# Patient Record
Sex: FEMALE | Race: WHITE | NOT HISPANIC OR LATINO | Employment: FULL TIME | URBAN - METROPOLITAN AREA
[De-identification: names, ages, dates, MRNs, and addresses within clinical notes are randomized per-mention and may not be internally consistent; named-entity substitution may affect disease eponyms.]

---

## 2017-01-03 ENCOUNTER — OFFICE VISIT (OUTPATIENT)
Dept: MEDICAL GROUP | Facility: MEDICAL CENTER | Age: 27
End: 2017-01-03
Payer: OTHER GOVERNMENT

## 2017-01-03 VITALS
SYSTOLIC BLOOD PRESSURE: 100 MMHG | HEART RATE: 77 BPM | WEIGHT: 170 LBS | DIASTOLIC BLOOD PRESSURE: 62 MMHG | TEMPERATURE: 98.4 F | OXYGEN SATURATION: 97 % | RESPIRATION RATE: 16 BRPM

## 2017-01-03 DIAGNOSIS — J30.2 SEASONAL ALLERGIC RHINITIS, UNSPECIFIED ALLERGIC RHINITIS TRIGGER: ICD-10-CM

## 2017-01-03 PROCEDURE — 99213 OFFICE O/P EST LOW 20 MIN: CPT | Performed by: NURSE PRACTITIONER

## 2017-01-03 RX ORDER — FEXOFENADINE HCL 60 MG/1
60 TABLET, FILM COATED ORAL DAILY
COMMUNITY

## 2017-01-03 NOTE — PROGRESS NOTES
Subjective:   Nasra Dhillon is a 26 y.o. female here today for allergies and referral to allergist.    Seasonal allergies  Patient presents today requesting referral to an allergist for seasonal and environmental allergies.  Has been using allegra and flonase daily with partial improvement.  C/o itchy eyes, rhinorrhea and PND t/o day and night, contact dermatitis from touching dogs.  Denies wheezing, sob, chest pain, difficulty breathing.  Reports allergy symptoms started in 2013 upon moving from [facility].           Current medicines (including changes today)  Current Outpatient Prescriptions   Medication Sig Dispense Refill   • fexofenadine (ALLEGRA) 60 MG Tab Take 60 mg by mouth every day.     • Fluticasone Propionate (FLONASE NA) Spray  in nose.       No current facility-administered medications for this visit.     She  has no past medical history of Cancer.    ROS   No chest pain, no shortness of breath, no abdominal pain       Objective:     Blood pressure 100/62, pulse 77, temperature 36.9 °C (98.4 °F), resp. rate 16, weight 77.111 kg (170 lb), last menstrual period 12/12/2016, SpO2 97 %, not currently breastfeeding. There is no height on file to calculate BMI.   Physical Exam:  Constitutional: Alert, no distress.  Skin: Warm, dry, good turgor, no rashes in visible areas.  Eye: Equal, round and reactive, conjunctiva clear, lids normal.  ENMT: Lips without lesions, good dentition, oropharynx clear. Nasal turbinates are swollen, pale and boggy.  Neck: Trachea midline, no masses, no thyromegaly. No cervical or supraclavicular lymphadenopathy  Respiratory: Unlabored respiratory effort, lungs clear to auscultation, no wheezes, no ronchi.  Cardiovascular: Normal S1, S2, no murmur, no edema.  Psych: Alert and oriented x3, normal affect and mood.        Assessment and Plan:   The following treatment plan was discussed    1. Seasonal allergic rhinitis, unspecified allergic rhinitis trigger  Continue  Allegra and Flonase.  Avoid allergens as much as possible.  Referred to allergist as requested.  Follow-up annually or sooner as needed.      Followup: Return in about 1 year (around 1/3/2018).

## 2017-01-03 NOTE — ASSESSMENT & PLAN NOTE
Patient presents today requesting referral to an allergist for seasonal and environmental allergies.  Has been using allegra and flonase daily with partial improvement.  C/o itchy eyes, rhinorrhea and PND t/o day and night, contact dermatitis from touching dogs.  Denies wheezing, sob, chest pain, difficulty breathing.  Reports allergy symptoms started in 2013 upon moving from [facility].

## 2017-01-03 NOTE — MR AVS SNAPSHOT
Nasra Cabralesn   1/3/2017 8:00 AM   Office Visit   MRN: [id number]    Department:  [facility]   Dept Phone:  [phone]    Description:  Female : 1990   Provider:  JORDYN Arellano           Reason for Visit     Referral Needed           Allergies as of 1/3/2017     No Known Allergies      You were diagnosed with     Seasonal allergic rhinitis, unspecified allergic rhinitis trigger   [[id number]]         Vital Signs     Blood Pressure Pulse Temperature Respirations Weight Oxygen Saturation    100/62 mmHg 77 36.9 °C (98.4 °F) 16 77.111 kg (170 lb) 97%    Last Menstrual Period Breastfeeding? Smoking Status             2016 No Never Smoker          Basic Information     Date Of Birth Sex Race Ethnicity Preferred Language    1990 Female White Non- English      Problem List              ICD-10-CM Priority Class Noted - Resolved    Seasonal allergies J30.2   2016 - Present    Preventative health care Z00.00   2016 - Present      Health Maintenance        Date Due Completion Dates    IMM HEP B VACCINE (1 of 3 - Primary Series) 1990 ---    IMM HEP A VACCINE (1 of 2 - Standard Series) 1991 ---    IMM HPV VACCINE (1 of 3 - Female 3 Dose Series) 2001 ---    IMM VARICELLA (CHICKENPOX) VACCINE (1 of 2 - 2 Dose Adolescent Series) 2003 ---    IMM DTaP/Tdap/Td Vaccine (1 - Tdap) 2009 ---    PAP SMEAR 2011 ---    IMM INFLUENZA (1) 2016 ---            Current Immunizations     No immunizations on file.      Below and/or attached are the medications your provider expects you to take. Review all of your home medications and newly ordered medications with your provider and/or pharmacist. Follow medication instructions as directed by your provider and/or pharmacist. Please keep your medication list with you and share with your provider. Update the information when medications are discontinued, doses are changed, or new medications (including  over-the-counter products) are added; and carry medication information at all times in the event of emergency situations     Allergies:  No Known Allergies          Medications  Valid as of: January 03, 2017 -  8:00 AM    Generic Name Brand Name Tablet Size Instructions for use    Fluticasone Propionate   Spray  in nose.        .                 Medicines prescribed today were sent to:     [facility] [id number]  [place], [place] - [address] AT [facility] [address] [facility]    [address] [place] [address]    Phone: [phone] Fax: [phone]    Open 24 Hours?: No      Medication refill instructions:       If your prescription bottle indicates you have medication refills left, it is not necessary to call your provider’s office. Please contact your pharmacy and they will refill your medication.    If your prescription bottle indicates you do not have any refills left, you may request refills at any time through one of the following ways: The online myMedScore system (except Urgent Care), by calling your provider’s office, or by asking your pharmacy to contact your provider’s office with a refill request. Medication refills are processed only during regular business hours and may not be available until the next business day. Your provider may request additional information or to have a follow-up visit with you prior to refilling your medication.   *Please Note: Medication refills are assigned a new Rx number when refilled electronically. Your pharmacy may indicate that no refills were authorized even though a new prescription for the same medication is available at the pharmacy. Please request the medicine by name with the pharmacy before contacting your provider for a refill.        Referral     A referral request has been sent to our patient care coordination department. Please allow 3-5 business days for us to process this request and contact you either by phone or mail. If you do not hear from us  by the 5th business day, please call us at [phone].           Cellfire Access Code: Activation code not generated  Current Cellfire Status: Active

## 2017-07-31 ENCOUNTER — OFFICE VISIT (OUTPATIENT)
Dept: MEDICAL GROUP | Facility: MEDICAL CENTER | Age: 27
End: 2017-07-31
Payer: OTHER GOVERNMENT

## 2017-07-31 VITALS
WEIGHT: 165.6 LBS | OXYGEN SATURATION: 98 % | BODY MASS INDEX: 29.34 KG/M2 | DIASTOLIC BLOOD PRESSURE: 70 MMHG | HEIGHT: 63 IN | SYSTOLIC BLOOD PRESSURE: 104 MMHG | HEART RATE: 86 BPM | RESPIRATION RATE: 16 BRPM | TEMPERATURE: 98.4 F

## 2017-07-31 DIAGNOSIS — Z76.89 ENCOUNTER TO ESTABLISH CARE: ICD-10-CM

## 2017-07-31 DIAGNOSIS — J30.2 SEASONAL ALLERGIC RHINITIS, UNSPECIFIED ALLERGIC RHINITIS TRIGGER: ICD-10-CM

## 2017-07-31 PROCEDURE — 99214 OFFICE O/P EST MOD 30 MIN: CPT | Performed by: PHYSICIAN ASSISTANT

## 2017-07-31 ASSESSMENT — PATIENT HEALTH QUESTIONNAIRE - PHQ9: CLINICAL INTERPRETATION OF PHQ2 SCORE: 0

## 2017-07-31 NOTE — PROGRESS NOTES
"Subjective:   Nasra Dhillon is a 26 y.o. female here today for establishing care and to obtain referral to [facility] allergy.    Seasonal allergies  This is a 26-year-old female who is here today to obtain a referral to see allergy. She sees  April Zarate at [facility] allergy. Currently she is getting weekly injections. She has a history of allergies to dogs, cats and horses. Also a plantar tube. She may take fexofenadine daily but doesn't need to use the medication because of the injections. She no longer is using a nasal spray. Denies any current exacerbations of symptoms.       Current medicines (including changes today)  Current Outpatient Prescriptions   Medication Sig Dispense Refill   • fexofenadine (ALLEGRA) 60 MG Tab Take 60 mg by mouth every day.       No current facility-administered medications for this visit.     She  has no past medical history of Cancer (CMS-[facility]).    ROS   No chest pain, no shortness of breath, no abdominal pain and all other systems were reviewed and are negative.       Objective:     Blood pressure 104/70, pulse 86, temperature 36.9 °C (98.4 °F), resp. rate 16, height 1.6 m (5' 2.99\"), weight 75.116 kg (165 lb 9.6 oz), last menstrual period 06/21/2017, SpO2 98 %, not currently breastfeeding. Body mass index is 29.34 kg/(m^2).   Physical Exam:  Constitutional: Alert, no distress.  Skin: Warm, dry, good turgor, no rashes in visible areas.  Eye: Equal, round and reactive, conjunctiva clear, lids normal.  ENMT: Lips without lesions, good dentition, oropharynx clear.  Neck: Trachea midline, no masses.   Lymph: No cervical or supraclavicular lymphadenopathy  Respiratory: Unlabored respiratory effort, lungs appear clear, no wheezes.  Cardiovascular: Regular rate and rhythm, no edema.  Psych: Alert and oriented x3, normal affect and mood.        Assessment and Plan:   The following treatment plan was discussed    1. Seasonal allergic rhinitis, unspecified allergic rhinitis " trigger  Chronic condition and improved using weekly injections. Refer to [facility] allergy. May contact him in 2 days to see about status of referral.  - REFERRAL TO ALLERGY    2. Encounter to establish care      Followup: Return if symptoms worsen or fail to improve.    Please note that this dictation was created using voice recognition software. I have made every reasonable attempt to correct obvious errors, but I expect that there are errors of grammar and possibly content that I did not discover before finalizing the note.

## 2017-07-31 NOTE — ASSESSMENT & PLAN NOTE
This is a 26-year-old female who is here today to obtain a referral to see allergy. She sees  April Zarate at [facility] allergy. Currently she is getting weekly injections. She has a history of allergies to dogs, cats and horses. Also a plantar tube. She may take fexofenadine daily but doesn't need to use the medication because of the injections. She no longer is using a nasal spray. Denies any current exacerbations of symptoms.

## 2017-10-02 ENCOUNTER — OFFICE VISIT (OUTPATIENT)
Dept: MEDICAL GROUP | Facility: MEDICAL CENTER | Age: 27
End: 2017-10-02
Payer: OTHER GOVERNMENT

## 2017-10-02 ENCOUNTER — TELEPHONE (OUTPATIENT)
Dept: MEDICAL GROUP | Facility: MEDICAL CENTER | Age: 27
End: 2017-10-02

## 2017-10-02 ENCOUNTER — HOSPITAL ENCOUNTER (OUTPATIENT)
Dept: RADIOLOGY | Facility: MEDICAL CENTER | Age: 27
End: 2017-10-02
Attending: PHYSICIAN ASSISTANT
Payer: OTHER GOVERNMENT

## 2017-10-02 VITALS
SYSTOLIC BLOOD PRESSURE: 118 MMHG | DIASTOLIC BLOOD PRESSURE: 80 MMHG | HEIGHT: 63 IN | HEART RATE: 74 BPM | TEMPERATURE: 98.6 F | RESPIRATION RATE: 16 BRPM | OXYGEN SATURATION: 96 % | BODY MASS INDEX: 29.48 KG/M2 | WEIGHT: 166.4 LBS

## 2017-10-02 DIAGNOSIS — M89.8X1 PAIN OF LEFT CLAVICLE: ICD-10-CM

## 2017-10-02 PROCEDURE — 73000 X-RAY EXAM OF COLLAR BONE: CPT | Mod: LT

## 2017-10-02 PROCEDURE — 99213 OFFICE O/P EST LOW 20 MIN: CPT | Performed by: PHYSICIAN ASSISTANT

## 2017-10-02 ASSESSMENT — PAIN SCALES - GENERAL: PAINLEVEL: 3=SLIGHT PAIN

## 2017-10-02 NOTE — TELEPHONE ENCOUNTER
----- Message from Merlin Reeves P.A.-C. sent at 10/2/2017  3:59 PM PDT -----  Please contact Nasra.  Clavicle without any findings on x-ray.  Expect possible with time it'll heal.  May follow-up with Ortho.  Contact me with any changes.  Thank you.    Merlin

## 2017-10-02 NOTE — PROGRESS NOTES
"Subjective:   Nasra Dhillon is a 27 y.o. female here today for left clavicular pain for 3 days.    Pain of left clavicle  This is a 27-year-old female comes in today complaining of a three-day history of pain of her left clavicle. She denies trauma. She states that she's noticed that there is an area that moves that's painful in the middle portion of the clavicle. She is not certain if it's a muscle. She does power cleaning. She denies any trauma with weight use. She stopped lifting on Thursday. Denies any triggers with the clavicle pain. Taking ibuprofen 600 mg. Also has been icing the clavicle.       Current medicines (including changes today)  Current Outpatient Prescriptions   Medication Sig Dispense Refill   • fexofenadine (ALLEGRA) 60 MG Tab Take 60 mg by mouth every day.       No current facility-administered medications for this visit.      She  has no past medical history of Cancer (CMS-[facility]).    ROS   No chest pain, no shortness of breath, no abdominal pain and all other systems were reviewed and are negative.       Objective:     Blood pressure 118/80, pulse 74, temperature 37 °C (98.6 °F), resp. rate 16, height 1.6 m (5' 2.99\"), weight 75.5 kg (166 lb 6.4 oz), SpO2 96 %. Body mass index is 29.49 kg/m².   Physical Exam:  Constitutional: Alert, no distress.  Skin: Warm, dry, good turgor, no rashes in visible areas.  Eye: Equal, round and reactive, conjunctiva clear, lids normal.  ENMT: Lips without lesions, good dentition, oropharynx clear.  Neck: Trachea midline, no masses.   Lymph: No cervical or supraclavicular lymphadenopathy  Respiratory: Unlabored respiratory effort, lungs appear clear, no wheezes.  Cardiovascular:Regular rate and rhythm.  Musculoskeletal: Symmetrical clavicles noted. Unable to elicit or palpate any masses of her left clavicle. It is point tenderness over mid clavicle on the left. Range of motion of her shoulder is good.  Psych: Alert and oriented x3, normal affect and " mood.        Assessment and Plan:   The following treatment plan was discussed    1. Pain of left clavicle  Acute, new onset condition. Etiology unknown but may be secondary to overuse. Ordered x-ray. Referred to orthopedics. Advised to continue ibuprofen as directed. Expect symptoms to gradually resolve.  - DX-CLAVICLE LEFT; Future  - REFERRAL TO ORTHOPEDICS      Followup: No Follow-up on file.    Please note that this dictation was created using voice recognition software. I have made every reasonable attempt to correct obvious errors, but I expect that there are errors of grammar and possibly content that I did not discover before finalizing the note.

## 2017-10-02 NOTE — TELEPHONE ENCOUNTER
Phone Number Called: [phone] (home)      Message: Left message for patient to call us back.     Left Message for patient to call back: yes

## 2017-10-02 NOTE — ASSESSMENT & PLAN NOTE
This is a 27-year-old female comes in today complaining of a three-day history of pain of her left clavicle. She denies trauma. She states that she's noticed that there is an area that moves that's painful in the middle portion of the clavicle. She is not certain if it's a muscle. She does power cleaning. She denies any trauma with weight use. She stopped lifting on Thursday. Denies any triggers with the clavicle pain. Taking ibuprofen 600 mg. Also has been icing the clavicle.

## 2017-10-03 NOTE — TELEPHONE ENCOUNTER
Phone Number Called: [phone] (home)      Message: Left message for patient to call us back.     Left Message for patient to call back: yes

## 2017-10-05 NOTE — TELEPHONE ENCOUNTER
Phone Number Called: [phone] (home)      Message: Left message for patient to call us back.     Left Message for patient to call back: yes

## 2019-05-30 DIAGNOSIS — R79.0 ABNORMAL BLOOD LEVEL OF IRON: ICD-10-CM

## 2020-09-25 DIAGNOSIS — J30.2 SEASONAL ALLERGIES: ICD-10-CM

## 2020-10-14 ENCOUNTER — TELEPHONE (OUTPATIENT)
Dept: MEDICAL GROUP | Facility: MEDICAL CENTER | Age: 30
End: 2020-10-14

## 2020-10-14 NOTE — TELEPHONE ENCOUNTER
----- Message from Val Finnegan sent at 10/14/2020 11:21 AM PDT -----  Regarding: FW: Flu Shot reaction  Contact: [phone]    ----- Message -----  From: Nasra Dhillon  Sent: 10/14/2020   9:18 AM PDT  To: Lissette All Mas  Subject: Flu Shot reaction                                Topic: Referral Status    Hi,    I just got the flu shot and had redness, swelling and warmth at the injection site. I haven't had this happen before. No other issues. Would this be something I should come in for if it is still there after 48 hours?    Thanks,  Nasra